# Patient Record
Sex: MALE | Race: BLACK OR AFRICAN AMERICAN | Employment: UNEMPLOYED | URBAN - METROPOLITAN AREA
[De-identification: names, ages, dates, MRNs, and addresses within clinical notes are randomized per-mention and may not be internally consistent; named-entity substitution may affect disease eponyms.]

---

## 2019-07-01 ENCOUNTER — ED HISTORICAL/CONVERTED ENCOUNTER (OUTPATIENT)
Dept: OTHER | Age: 29
End: 2019-07-01

## 2019-09-10 ENCOUNTER — ED HISTORICAL/CONVERTED ENCOUNTER (OUTPATIENT)
Dept: OTHER | Age: 29
End: 2019-09-10

## 2020-06-04 ENCOUNTER — ED HISTORICAL/CONVERTED ENCOUNTER (OUTPATIENT)
Dept: OTHER | Age: 30
End: 2020-06-04

## 2022-01-25 ENCOUNTER — HOSPITAL ENCOUNTER (OUTPATIENT)
Dept: PHYSICAL THERAPY | Age: 32
Discharge: HOME | End: 2022-01-25

## 2022-01-25 PROCEDURE — 97150 GROUP THERAPEUTIC PROCEDURES: CPT

## 2025-01-15 ENCOUNTER — HOSPITAL ENCOUNTER (EMERGENCY)
Facility: HOSPITAL | Age: 35
Discharge: HOME | End: 2025-01-16

## 2025-01-15 DIAGNOSIS — S61.412A LACERATION OF LEFT HAND, FOREIGN BODY PRESENCE UNSPECIFIED, INITIAL ENCOUNTER: Primary | ICD-10-CM

## 2025-01-15 PROCEDURE — 12002 RPR S/N/AX/GEN/TRNK2.6-7.5CM: CPT

## 2025-01-15 PROCEDURE — 94761 N-INVAS EAR/PLS OXIMETRY MLT: CPT

## 2025-01-15 PROCEDURE — 99284 EMERGENCY DEPT VISIT MOD MDM: CPT

## 2025-01-15 ASSESSMENT — PAIN DESCRIPTION - LOCATION: LOCATION: HAND

## 2025-01-15 ASSESSMENT — PAIN SCALES - GENERAL: PAINLEVEL_OUTOF10: 10

## 2025-01-15 ASSESSMENT — PAIN DESCRIPTION - ORIENTATION: ORIENTATION: LEFT

## 2025-01-15 ASSESSMENT — PAIN - FUNCTIONAL ASSESSMENT: PAIN_FUNCTIONAL_ASSESSMENT: 0-10

## 2025-01-16 ENCOUNTER — APPOINTMENT (OUTPATIENT)
Facility: HOSPITAL | Age: 35
End: 2025-01-16

## 2025-01-16 VITALS
RESPIRATION RATE: 20 BRPM | BODY MASS INDEX: 25.2 KG/M2 | HEART RATE: 88 BPM | TEMPERATURE: 98 F | HEIGHT: 71 IN | WEIGHT: 180 LBS | SYSTOLIC BLOOD PRESSURE: 130 MMHG | DIASTOLIC BLOOD PRESSURE: 76 MMHG | OXYGEN SATURATION: 100 %

## 2025-01-16 PROCEDURE — 73130 X-RAY EXAM OF HAND: CPT

## 2025-01-16 PROCEDURE — 6360000002 HC RX W HCPCS: Performed by: NURSE PRACTITIONER

## 2025-01-16 PROCEDURE — 90714 TD VACC NO PRESV 7 YRS+ IM: CPT | Performed by: NURSE PRACTITIONER

## 2025-01-16 PROCEDURE — 90471 IMMUNIZATION ADMIN: CPT | Performed by: NURSE PRACTITIONER

## 2025-01-16 RX ADMIN — CLOSTRIDIUM TETANI TOXOID ANTIGEN (FORMALDEHYDE INACTIVATED) AND CORYNEBACTERIUM DIPHTHERIAE TOXOID ANTIGEN (FORMALDEHYDE INACTIVATED) 0.5 ML: 5; 2 INJECTION, SUSPENSION INTRAMUSCULAR at 01:25

## 2025-01-16 ASSESSMENT — PAIN - FUNCTIONAL ASSESSMENT: PAIN_FUNCTIONAL_ASSESSMENT: NONE - DENIES PAIN

## 2025-01-16 NOTE — DISCHARGE INSTRUCTIONS
Keep wound dry x 24hrs then you may shower, monitor for signs and symptoms of infection. Follow up with your PCP or return to ED sooner for worsening symptoms. Do no pick at skin glue it will fall off after approximately 10 days

## 2025-01-16 NOTE — ED NOTES
Washed patient's hand at this time, after cleaning noted with 0.5 cm laceration to palm of left hand. Dressing placed at this time.

## 2025-01-16 NOTE — ED PROVIDER NOTES
[facility]  EMERGENCY DEPARTMENT ENCOUNTER NOTE    Date: 1/15/2025  Patient Name: Dileep Yañez    History of Presenting Illness     Chief Complaint   Patient presents with    Laceration       History obtained from: Patient    HPI: Dileep Yañez, 34 y.o. male with no known significant past medical history presents for a laceration.    Laceration location: left palm.  Incident: pushing trash down in can and was cut but metal can.  Incident time: PTA.  Hemodynamically stable without bleeding.  Other injuries none.  Last tetanus unknown - will update today    Medical History   I reviewed the medical, surgical, family, and social history, as well as allergies:    PCP: No primary care provider on file.    Past Medical History:  No past medical history on file.  Past Surgical History:  No past surgical history on file.  Current Outpatient Medications:  No current outpatient medications   Family History:  No family history on file.  Social History:     Allergies:  Allergies   Allergen Reactions    Morphine Anaphylaxis       Physical Exam and Vital Signs   Vital Signs - Reviewed the patient's vital signs.    Vitals:    01/15/25 2319 01/16/25 0125   BP: (!) 141/89 130/76   Pulse: 87 88   Resp: 18 20   Temp: 97.6 °F (36.4 °C) 98 °F (36.7 °C)   TempSrc: Oral Oral   SpO2: 100%    Weight: 81.6 kg (180 lb)    Height: 1.803 m (5' 11\")      Physical Exam:    GENERAL: not in apparent distress  HEENT:  * EOMI  * Head atraumatic  CV:  * warm extremities  * no cyanosis  PULMONARY: no respiratory distress, non labored breathing, no audible wheezing or stridor, no accessory muscle use  ABDOMEN: soft, moving in bed and pulls to seated position without grimace or pain  EXTREMITIES/BACK: moving four extremities without limitation  SKIN: laceration noted left palm superficial 3cm linear, no tendon involvement  NEURO:  * Speech clear  * GCS 15    Medical Decision Making     Patient is a 34 y.o. male

## 2025-01-16 NOTE — ED TRIAGE NOTES
Pt accidentally cut his left palm with a can while fixing something in the trash bin. Bleeding controlled.  Not updated with his tetanus shots.